# Patient Record
Sex: MALE | Race: WHITE | NOT HISPANIC OR LATINO | Employment: FULL TIME | URBAN - METROPOLITAN AREA
[De-identification: names, ages, dates, MRNs, and addresses within clinical notes are randomized per-mention and may not be internally consistent; named-entity substitution may affect disease eponyms.]

---

## 2019-12-19 ENCOUNTER — HOSPITAL ENCOUNTER (EMERGENCY)
Facility: HOSPITAL | Age: 34
Discharge: HOME | End: 2019-12-19
Attending: EMERGENCY MEDICINE
Payer: OTHER GOVERNMENT

## 2019-12-19 VITALS
HEIGHT: 73 IN | HEART RATE: 73 BPM | TEMPERATURE: 98 F | RESPIRATION RATE: 16 BRPM | WEIGHT: 219 LBS | DIASTOLIC BLOOD PRESSURE: 80 MMHG | OXYGEN SATURATION: 96 % | BODY MASS INDEX: 29.03 KG/M2 | SYSTOLIC BLOOD PRESSURE: 141 MMHG

## 2019-12-19 DIAGNOSIS — S05.02XA ABRASION OF LEFT CORNEA, INITIAL ENCOUNTER: Primary | ICD-10-CM

## 2019-12-19 PROCEDURE — 25000003 PHARM REV CODE 250

## 2019-12-19 PROCEDURE — 25000003 PHARM REV CODE 250: Performed by: PHYSICIAN ASSISTANT

## 2019-12-19 PROCEDURE — 99283 EMERGENCY DEPT VISIT LOW MDM: CPT

## 2019-12-19 RX ORDER — TETRACAINE HYDROCHLORIDE 5 MG/ML
SOLUTION OPHTHALMIC
Status: COMPLETED
Start: 2019-12-19 | End: 2019-12-19

## 2019-12-19 RX ORDER — TETRACAINE HYDROCHLORIDE 5 MG/ML
2 SOLUTION OPHTHALMIC
Status: COMPLETED | OUTPATIENT
Start: 2019-12-19 | End: 2019-12-19

## 2019-12-19 RX ORDER — MOXIFLOXACIN 5 MG/ML
1 SOLUTION/ DROPS OPHTHALMIC 3 TIMES DAILY
Qty: 3 ML | Refills: 0 | Status: SHIPPED | OUTPATIENT
Start: 2019-12-19

## 2019-12-19 RX ADMIN — TETRACAINE HYDROCHLORIDE: 5 SOLUTION OPHTHALMIC at 05:12

## 2019-12-19 RX ADMIN — FLUORESCEIN SODIUM 1 EACH: 1 STRIP OPHTHALMIC at 05:12

## 2019-12-19 NOTE — ED PROVIDER NOTES
Encounter Date: 12/19/2019       History     Chief Complaint   Patient presents with    Eye Injury     Pt presents to the ER with copmlaint of left eye pain pt stated scrapped his eye with a branch.  Patient states has mild blurry vision and 2 months ago had p.r. K complete.  Patient denies any current pain denies any runny stuffy nose denies any feeling of foreign body in his eye.  Patient states is not currently taking any medicines states his tetanus is up-to-date.  Patient denies other associated symptoms.    The history is provided by the patient and a parent.     Review of patient's allergies indicates:  No Known Allergies  History reviewed. No pertinent past medical history.  Past Surgical History:   Procedure Laterality Date    photo reflective keratectomy       History reviewed. No pertinent family history.  Social History     Tobacco Use    Smoking status: Never Smoker   Substance Use Topics    Alcohol use: Yes     Comment: socially     Drug use: Never     Review of Systems   Constitutional: Negative for fever.   HENT: Negative for sore throat.    Eyes: Positive for discharge (Watery left), redness ( left) and visual disturbance ( blurry to the left). Negative for photophobia, pain and itching.   Respiratory: Negative for shortness of breath.    Cardiovascular: Negative for chest pain.   Gastrointestinal: Negative for abdominal pain, constipation, diarrhea, nausea and vomiting.   Genitourinary: Negative for dysuria.   Musculoskeletal: Negative for back pain.   Skin: Negative for rash.   Neurological: Negative for weakness.   Hematological: Does not bruise/bleed easily.   All other systems reviewed and are negative.      Physical Exam     Initial Vitals [12/19/19 1653]   BP Pulse Resp Temp SpO2   (!) 141/80 73 16 97.6 °F (36.4 °C) 96 %      MAP       --         Physical Exam    Nursing note and vitals reviewed.  Constitutional: He appears well-developed and well-nourished. He is not diaphoretic. No  distress.   HENT:   Head: Normocephalic and atraumatic.   Right Ear: External ear normal.   Left Ear: External ear normal.   Nose: Nose normal.   Mouth/Throat: Oropharynx is clear and moist. No oropharyngeal exudate.   Eyes: EOM and lids are normal. Pupils are equal, round, and reactive to light. Right eye exhibits no chemosis, no discharge and no exudate. No foreign body present in the right eye. Left eye exhibits discharge (Clear thin). Left eye exhibits no chemosis and no exudate. No foreign body present in the left eye. Right conjunctiva is not injected. Right conjunctiva has no hemorrhage. Left conjunctiva is injected. Left conjunctiva has no hemorrhage.   Slit lamp exam:       The right eye shows no corneal abrasion, no corneal flare, no corneal ulcer, no foreign body, no hyphema, no hypopyon and no fluorescein uptake.        The left eye shows corneal abrasion. The left eye shows no corneal flare, no corneal ulcer, no foreign body, no hyphema, no hypopyon and no fluorescein uptake.       Conjunctival injection to left eye no foreign body seen   Neck: Normal range of motion. Neck supple.   Cardiovascular: Normal rate, regular rhythm and intact distal pulses.   Pulmonary/Chest: No respiratory distress.   Musculoskeletal: Normal range of motion.   Neurological: He is alert and oriented to person, place, and time. GCS score is 15. GCS eye subscore is 4. GCS verbal subscore is 5. GCS motor subscore is 6.   Skin: Skin is warm and dry. Capillary refill takes less than 2 seconds.   Psychiatric: He has a normal mood and affect. Thought content normal.         ED Course   Procedures  Labs Reviewed - No data to display       Imaging Results    None          Medical Decision Making:   Differential Diagnosis:   Corneal abrasion foreign body eye pain  ED Management:  Discussed with patient plan of care stated she understood she did not have any questions.    Discussed slit-lamp exam as well as fluorescein stain that showed  corneal abrasion discussed need for follow-up with Ophthalmology within 48 hr the patient verbalized the understood discussed return to ER precautions as well as antibiotic prescription the patient verbalized understood did not have any questions.    This note was created using [employer] voice recognition software that occasionally misinterpreted phrases or words.                                 Clinical Impression:       ICD-10-CM ICD-9-CM   1. Abrasion of left cornea, initial encounter S05.02XA 918.1                             XIMENA Lyons  12/19/19 1904

## 2019-12-20 NOTE — DISCHARGE INSTRUCTIONS
Be sure to use all abx as prescribed.    If acute worsening of symptoms return to ER for reevaluation.